# Patient Record
Sex: MALE | Race: WHITE
[De-identification: names, ages, dates, MRNs, and addresses within clinical notes are randomized per-mention and may not be internally consistent; named-entity substitution may affect disease eponyms.]

---
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
## 2019-06-23 ENCOUNTER — HOSPITAL ENCOUNTER (EMERGENCY)
Dept: HOSPITAL 91 - FTE | Age: 19
Discharge: HOME | End: 2019-06-23
Payer: MEDICAID

## 2019-06-23 ENCOUNTER — HOSPITAL ENCOUNTER (EMERGENCY)
Dept: HOSPITAL 10 - FTE | Age: 19
Discharge: HOME | End: 2019-06-23
Payer: MEDICAID

## 2019-06-23 VITALS — SYSTOLIC BLOOD PRESSURE: 134 MMHG | RESPIRATION RATE: 18 BRPM | HEART RATE: 148 BPM | DIASTOLIC BLOOD PRESSURE: 79 MMHG

## 2019-06-23 VITALS
HEIGHT: 66 IN | BODY MASS INDEX: 21.12 KG/M2 | WEIGHT: 131.4 LBS | WEIGHT: 131.4 LBS | HEIGHT: 66 IN | BODY MASS INDEX: 21.12 KG/M2

## 2019-06-23 DIAGNOSIS — R36.9: Primary | ICD-10-CM

## 2019-06-23 LAB
ADD UMIC: YES
UR ASCORBIC ACID: NEGATIVE MG/DL
UR BACTERIA: (no result) /HPF
UR BILIRUBIN (DIP): NEGATIVE MG/DL
UR BLOOD (DIP): NEGATIVE MG/DL
UR CLARITY: (no result)
UR COLOR: (no result)
UR GLUCOSE (DIP): NEGATIVE MG/DL
UR KETONES (DIP): NEGATIVE MG/DL
UR LEUKOCYTE ESTERASE (DIP): (no result) LEU/UL
UR MUCUS: (no result) /HPF
UR NITRITE (DIP): NEGATIVE MG/DL
UR PH (DIP): 6 (ref 5–9)
UR RBC: 19 /HPF (ref 0–5)
UR SPECIFIC GRAVITY (DIP): 1.03 (ref 1–1.03)
UR SQUAMOUS EPITHELIAL CELL: (no result) /HPF
UR TOTAL PROTEIN (DIP): (no result) MG/DL
UR UROBILINOGEN (DIP): (no result) MG/DL
UR WBC: > 182 /HPF (ref 0–5)

## 2019-06-23 PROCEDURE — 99284 EMERGENCY DEPT VISIT MOD MDM: CPT

## 2019-06-23 PROCEDURE — 87591 N.GONORRHOEAE DNA AMP PROB: CPT

## 2019-06-23 PROCEDURE — 81001 URINALYSIS AUTO W/SCOPE: CPT

## 2019-06-23 PROCEDURE — 96372 THER/PROPH/DIAG INJ SC/IM: CPT

## 2019-06-23 RX ADMIN — AZITHROMYCIN 1 MG: 500 TABLET, FILM COATED ORAL at 14:45

## 2019-06-23 RX ADMIN — LIDOCAINE HYDROCHLORIDE 1 ML: 10 INJECTION, SOLUTION EPIDURAL; INFILTRATION; INTRACAUDAL; PERINEURAL at 14:45

## 2019-06-23 RX ADMIN — CEFTRIAXONE SODIUM 1 MG: 250 INJECTION, POWDER, FOR SOLUTION INTRAMUSCULAR; INTRAVENOUS at 14:45

## 2019-06-23 NOTE — ERD
ER Documentation


Chief Complaint


Chief Complaint





pain/burning/puss with urination x 3 days





HPI


18 yr old male presents to the Ed complaining of pain with urination and penile 


d/c x 3 days. He denies any fevers. He denies a previous hx of similar incidence


when asked. He reports that he has only 1 sexual partner that he is loyal too 


and denies any risky behaviors. He reports that there is white puss coming out 


of his penis. He denies any swollen lymph nodes or testicular pain. He reports 


the pain with urination 3/10 stinging pain.





ROS


All systems reviewed and are negative except as per history of present illness.





Medications


Home Meds


Active Scripts


Cephalexin* (Keflex*) 500 Mg Capsule, 500 MG PO QID for 5 Days, CAP


   Prov:MARTÍN CHENG PA-C         6/23/19


Ibuprofen* (Motrin*) 400 Mg Tab, 400 MG PO Q8, #30 TAB


   Prov:SILVA BERMUDEZ MD         11/18/18


Cephalexin* (Keflex*) 500 Mg Capsule, 500 MG PO TID for 7 Days, CAP


   Prov:SILVA BERMUDEZ MD         11/18/18





Allergies


Allergies:  


Coded Allergies:  


     No Known Allergy (Unverified , 6/23/19)





PMhx/Soc


History of Surgery:  No


Anesthesia Reaction:  No


Hx Neurological Disorder:  No


Hx Respiratory Disorders:  No


Hx Cardiac Disorders:  No


Hx Psychiatric Problems:  No


Hx Miscellaneous Medical Probl:  No


Hx Alcohol Use:  No


Hx Substance Use:  No


Hx Tobacco Use:  No


Smoking Status:  Never smoker





FmHx


Family History:  No diabetes





Physical Exam


Vitals





Vital Signs


  Date      Temp  Pulse  Resp  B/P (MAP)   Pulse Ox  O2          O2 Flow    FiO2


Time                                                 Delivery    Rate


   6/23/19  97.2    148    18      134/79        97


     12:29                           (97)





Physical Exam


Const:   No acute distress


Head:   Atraumatic 


Eyes:    Normal Conjunctiva


ENT:    Normal External Ears, Nose and Mouth.


Neck:               Full range of motion. 


Resp:   Clear to auscultation bilaterally


Cardio:   Regular rate and rhythm, 


Abd:    Soft, non tender, non distended


Skin:   : no tender/swollen lymph nodes. No balanitis. No testicular pain. 


White d/c present coming out of penis


Back:   No midline or flank tenderness


Ext:    No cyanosis, or edema


Neur:   Awake and alert


Psych:    Normal Mood and Affect


Results 24 hrs





Laboratory Tests


                Test
                              6/23/19
13:15


                Urine Color                      MALOU


                Urine Clarity                    CLOUDY


                Urine pH                                    6.0


                Urine Specific Gravity                    1.027


                Urine Ketones                    NEGATIVE mg/dL


                Urine Nitrite                    NEGATIVE mg/dL


                Urine Bilirubin                  NEGATIVE mg/dL


                Urine Urobilinogen                     1+ mg/dL


                Urine Leukocyte Esterase              3+ Merline/ul


                Urine Microscopic RBC                   19 /HPF


                Urine Microscopic WBC            > 182 /HPF


                Urine Squamous Epithelial
Cells  FEW /HPF 



                Urine Bacteria                   FEW /HPF


                Urine Mucus                      MODERATE /HPF


                Urine Hemoglobin                 NEGATIVE mg/dL


                Urine Glucose                    NEGATIVE mg/dL


                Urine Total Protein                    2+ mg/dl





Current Medications


 Medications
   Dose
          Sig/Raji
       Start Time
   Status  Last


 (Trade)       Ordered        Route
 PRN     Stop Time              Admin
Dose


                              Reason                                Admin


 Ceftriaxone    250 mg         ONCE  ONCE
    6/23/19       DC           6/23/19


Sodium
                       IM
            14:30
                       14:45



(Rocephin)                                   6/23/19 14:32


 Lidocaine
     5 ml           ONCE  ONCE
    6/23/19       DC           6/23/19


(Xylocaine                    INJ
           14:30
                       14:45



1%
  (Mpf))                                  6/23/19 14:32


                1,000 mg       ONCE  ONCE
    6/23/19       DC           6/23/19


Azithromycin
                 PO
            14:30
                       14:45



 (Zithromax)                                 6/23/19 14:32








Procedures/MDM


ED COURSE:


The patient was stable throughout ED course. I kept the patient informed of labo


ratory and diagnostic imaging results throughout the ED course.  








DIAGNOSTIC IMAGING:


NONE











PROCEDURES:


 NONE








MEDICATIONS GIVEN: 


Azithromycin, Lidocaine, Rocephin


Patient tolerated medication well with no adverse reactions. Patient reported 


improvement in pain. 











MEDICAL DECISION MAKING:


Patient is a 18 yr old male reporting pain with urination and white d/c from his


penis x 3 days. He denies any new sexual partners or risky behavior. He is quite


and not very open to conversation about this subject. On exam, white d/c 


expelled out of the penis. Urinalysis was done showing signs of infection. G/C 


was ordered and sent out. Patient stated that he wants to be treated for this 


now while he is here. Azithromycin, Lidocaine, and Rocephin was given to the p


atient in the ED stay with no ASE. Patient was discharged with a  prescription 


for Keflex and told to follow up with primary care in the next 1-2 days. Patient


was told he would get a call for his G/C results. Vital signs were reviewed. 


Patient is afebrile. Patient was not hypoxic. Patient was hemodynamically 


stable. 








PRESCRIPTION: Keflex





DISCHARGE:


At this time, patient is stable for discharge and outpatient management. I have 


instructed the patient to follow-up with his/her primary care physician in 1-2 


days. I have discussed with the patient the possibility of needing to see a 


specialist for further workup and imaging studies if symptoms persist. I have 


instructed the patient to promptly return to the ER for any new or worsening 


symptoms including increased pain, fever, nausea, vomiting, weakness or LOC. The


patient and/or family expressed understanding of and agreement with this plan. 


All questions were answered. Home care instructions were provided. 





Disclaimer: Inadvertent spelling and grammatical errors are likely due to 


EHR/dictation software use and do not reflect on the overall quality of patient 


care. Also, please note that the electronic time recorded on this note does not 


necessarily reflect the actual time of the patient encounter.





Departure


Diagnosis:  


   Primary Impression:  


   Discharge from genitalia


Condition:  Fair


Patient Instructions:  Urethritis, Male (Gonorrhea)


Referrals:  


[facility]


YOU HAVE RECEIVED A MEDICAL SCREENING EXAM AND THE RESULTS INDICATE THAT YOU DO 


NOT HAVE A CONDITION THAT REQUIRES URGENT TREATMENT IN THE EMERGENCY DEPARTMENT.





FURTHER EVALUATION AND TREATMENT OF YOUR CONDITION CAN WAIT UNTIL YOU ARE SEEN 


IN YOUR DOCTORS OFFICE WITHIN THE NEXT 1-2 DAYS. IT IS YOUR RESPONSIBILITY TO 


MAKE AN APPOINTMENT FOR FOLOW-UP CARE.





IF YOU HAVE A PRIMARY DOCTOR


--you should call your primary doctor and schedule an appointment





IF YOU DO NOT HAVE A PRIMARY DOCTOR YOU CAN CALL OUR PHYSICIAN REFERRAL HOTLINE 


AT


 [phone] 





IF YOU CAN NOT AFFORD TO SEE A PHYSICIAN YOU CAN CHOSE FROM THE FOLLOWING 


[facility]





[facility] [phone] [address] [facility] [phone] [address] [facility]. [facility] [phone] [address] [facility] [phone] [address] [facility] [phone] [address] [facility]. [facility]. [phone] [address] [facility]. [facility]


YOU HAVE RECEIVED A MEDICAL SCREENING EXAM AND THE RESULTS INDICATE THAT YOU DO 


NOT HAVE A CONDITION THAT REQUIRES URGENT TREATMENT IN THE EMERGENCY DEPARTMENT.





FURTHER EVALUATION AND TREATMENT OF YOUR CONDITION CAN WAIT UNTIL YOU ARE SEEN 


IN YOUR DOCTORS OFFICE WITHIN THE NEXT 1-2 DAYS. IT IS YOUR RESPONSIBILITY TO 


MAKE AN APPOINTMENT FOR FOLOW-UP CARE.





IF YOU HAVE A PRIMARY DOCTOR


--you should call your primary doctor and schedule and appointment





IF YOU DO NOT HAVE A PRIMARY DOCTOR YOU CAN CALL OUR PHYSICIAN REFERRAL HOTLINE 


AT [phone].





IF YOU CAN NOT AFFORD TO SEE A PHYSICIAN YOU CAN CHOSE FROM THE FOLLOWING [facility]


INSTITUTIONS:





[facility]


[address] [place] [address]





[facility]


[address] [place] [address]





[facility]


[address] [place] [address]





Additional Instructions:  


Call your primary care doctor TOMORROW for an appointment during the next 1-2 


days.See the doctor sooner or return here if your condition worsens before your 


appointment time.











MARTÍN CHENG PA-C           Jun 23, 2019 14:35

## 2019-06-25 LAB — LABORATORY COMMENT REPORT: (no result)

## 2019-07-31 ENCOUNTER — HOSPITAL ENCOUNTER (EMERGENCY)
Dept: HOSPITAL 91 - FTE | Age: 19
Discharge: HOME | End: 2019-07-31
Payer: MEDICAID

## 2019-07-31 ENCOUNTER — HOSPITAL ENCOUNTER (EMERGENCY)
Dept: HOSPITAL 10 - FTE | Age: 19
Discharge: HOME | End: 2019-07-31
Payer: MEDICAID

## 2019-07-31 VITALS
HEIGHT: 65 IN | BODY MASS INDEX: 21.67 KG/M2 | BODY MASS INDEX: 21.67 KG/M2 | WEIGHT: 130.07 LBS | WEIGHT: 130.07 LBS | HEIGHT: 65 IN

## 2019-07-31 VITALS — DIASTOLIC BLOOD PRESSURE: 80 MMHG | HEART RATE: 104 BPM | SYSTOLIC BLOOD PRESSURE: 129 MMHG | RESPIRATION RATE: 24 BRPM

## 2019-07-31 DIAGNOSIS — N34.2: Primary | ICD-10-CM

## 2019-07-31 PROCEDURE — 96372 THER/PROPH/DIAG INJ SC/IM: CPT

## 2019-07-31 PROCEDURE — 87591 N.GONORRHOEAE DNA AMP PROB: CPT

## 2019-07-31 PROCEDURE — 87086 URINE CULTURE/COLONY COUNT: CPT

## 2019-07-31 PROCEDURE — 99284 EMERGENCY DEPT VISIT MOD MDM: CPT

## 2019-07-31 RX ADMIN — AZITHROMYCIN 1 MG: 500 TABLET, FILM COATED ORAL at 10:23

## 2019-07-31 RX ADMIN — CEFTRIAXONE SODIUM 1 MG: 250 INJECTION, POWDER, FOR SOLUTION INTRAMUSCULAR; INTRAVENOUS at 10:23

## 2019-07-31 NOTE — ERD
ER Documentation


Chief Complaint


Chief Complaint





painful urination, puus in the urine started yesterday





HPI


18-year-old male is here with dysuria and purulent drainage from his penis that 


began yesterday.  He was seen here a little over a month ago for the same and 


was treated and states he did get better.  He does admit to recent unprotected 


sex about 2 weeks ago he had no fever.  No nausea or vomiting.  No testicular 


swelling.  No hematuria.





ROS


All systems reviewed and are negative except as per history of present illness.





Medications


Home Meds


Active Scripts


Doxycycline Hyclate* (Doxycycline Hyclate*) 100 Mg Tablet.dr, 100 MG PO BID for 


7 Days, TAB


   Prov:GREGORIO LEAHY PA-C         7/31/19


Cephalexin* (Keflex*) 500 Mg Capsule, 500 MG PO QID for 5 Days, CAP


   Prov:MARTÍN CHENG PA-C         6/23/19


Ibuprofen* (Motrin*) 400 Mg Tab, 400 MG PO Q8, #30 TAB


   Prov:SILVA BERMUDEZ MD         11/18/18


Cephalexin* (Keflex*) 500 Mg Capsule, 500 MG PO TID for 7 Days, CAP


   Prov:SILVA BERMUDEZ MD         11/18/18





Allergies


Allergies:  


Coded Allergies:  


     No Known Allergy (Unverified , 6/23/19)





PMhx/Soc


History of Surgery:  No


Anesthesia Reaction:  No


Hx Neurological Disorder:  No


Hx Respiratory Disorders:  No


Hx Cardiac Disorders:  No


Hx Psychiatric Problems:  No


Hx Miscellaneous Medical Probl:  No


Hx Alcohol Use:  No


Hx Substance Use:  No


Hx Tobacco Use:  No





FmHx


Family History:  No diabetes





Physical Exam


Vitals





Vital Signs


  Date      Temp  Pulse  Resp  B/P (MAP)   Pulse Ox  O2          O2 Flow    FiO2


Time                                                 Delivery    Rate


   7/31/19  98.0    104    24      129/80       100


     10:04                           (96)





Physical Exam


Const:   No acute distress


Head:   Atraumatic 


Eyes:    Normal Conjunctiva


ENT:    Normal External Ears, Nose and Mouth.


Neck:               Full range of motion. No meningismus.


Resp:   Clear to auscultation bilaterally


Cardio:   Regular rate and rhythm, no murmurs


Abd:    Soft, non tender, non distended. Normal bowel sounds


: Scant purulent drainage from tip of penis, no inguinal lymphadenopathy, no 


penile rashes, no testicular tenderness or swelling


Results 24 hrs





Current Medications


 Medications
   Dose
          Sig/Raji
       Start Time
   Status  Last


 (Trade)       Ordered        Route
 PRN     Stop Time              Admin
Dose


                              Reason                                Admin


                1,000 mg       ONCE  ONCE
    7/31/19                



Azithromycin
                 PO
            10:30



 (Zithromax)                                 7/31/19 10:31


 Ceftriaxone    250 mg         ONCE  ONCE
    7/31/19                



Sodium
                       IM
            10:30



(Rocephin)                                   7/31/19 10:31








Procedures/MDM


I reviewed patient's previous visit which she was here for the same thing a 


little over a month ago and his gonorrhea chlamydia culture did come back 


positive.  Therefore today urine culture was sent and gonorrhea chlamydia test 


was again resent.  He was given Rocephin and ceftriaxone and discharged with 


doxycycline.  Patient needs to follow-up with his primary care doctor for full 


STD panel testing and avoid sexual contact until symptoms of all been resolved 


and he has been tested.  Patient counseled regarding my diagnostic impression 


and care plan. Prior to discharge all questions answered. Pt agrees with 


treatment plan and understands strict return precautions. Pt is instructed to 


follow up with primary care provider within 24-48 hours. Precautionary 


instructions provided including instructions to return to the ER if not 


improving or for any worsening or changing symptoms or concerns.





Departure


Diagnosis:  


   Primary Impression:  


   Urethritis


Condition:  Stable


Patient Instructions:  Urethritis, Male (Gc Vs. Chlam)





Additional Instructions:  


Call your primary care doctor TOMORROW for an appointment during the next 1-2 


days.See the doctor sooner or return here if your condition worsens before your 


appointment time.











GREGORIO LEAHY PA-C            Jul 31, 2019 10:26

## 2019-08-01 LAB — LABORATORY COMMENT REPORT: (no result)

## 2019-09-01 ENCOUNTER — HOSPITAL ENCOUNTER (EMERGENCY)
Dept: HOSPITAL 91 - FTE | Age: 19
LOS: 1 days | Discharge: HOME | End: 2019-09-02
Payer: MEDICAID

## 2019-09-01 ENCOUNTER — HOSPITAL ENCOUNTER (EMERGENCY)
Dept: HOSPITAL 10 - FTE | Age: 19
LOS: 1 days | Discharge: HOME | End: 2019-09-02
Payer: MEDICAID

## 2019-09-01 VITALS
HEIGHT: 65 IN | BODY MASS INDEX: 22.92 KG/M2 | WEIGHT: 137.57 LBS | HEIGHT: 65 IN | WEIGHT: 137.57 LBS | BODY MASS INDEX: 22.92 KG/M2

## 2019-09-01 DIAGNOSIS — A64: Primary | ICD-10-CM

## 2019-09-01 DIAGNOSIS — F17.210: ICD-10-CM

## 2019-09-01 PROCEDURE — 96372 THER/PROPH/DIAG INJ SC/IM: CPT

## 2019-09-01 PROCEDURE — 99284 EMERGENCY DEPT VISIT MOD MDM: CPT

## 2019-09-01 PROCEDURE — 87086 URINE CULTURE/COLONY COUNT: CPT

## 2019-09-01 PROCEDURE — 81001 URINALYSIS AUTO W/SCOPE: CPT

## 2019-09-01 PROCEDURE — 36415 COLL VENOUS BLD VENIPUNCTURE: CPT

## 2019-09-01 PROCEDURE — 86592 SYPHILIS TEST NON-TREP QUAL: CPT

## 2019-09-01 PROCEDURE — 87591 N.GONORRHOEAE DNA AMP PROB: CPT

## 2019-09-02 LAB
ADD UMIC: YES
RAPID PLASMA REAGIN: NONREACTIVE
UR ASCORBIC ACID: NEGATIVE MG/DL
UR BACTERIA: (no result) /HPF
UR BILIRUBIN (DIP): NEGATIVE MG/DL
UR BLOOD (DIP): NEGATIVE MG/DL
UR CLARITY: CLEAR
UR COLOR: YELLOW
UR GLUCOSE (DIP): NEGATIVE MG/DL
UR KETONES (DIP): NEGATIVE MG/DL
UR LEUKOCYTE ESTERASE (DIP): (no result) LEU/UL
UR NITRITE (DIP): NEGATIVE MG/DL
UR PH (DIP): 5 (ref 5–9)
UR RBC: 1 /HPF (ref 0–5)
UR SPECIFIC GRAVITY (DIP): 1.02 (ref 1–1.03)
UR TOTAL PROTEIN (DIP): NEGATIVE MG/DL
UR UROBILINOGEN (DIP): NEGATIVE MG/DL
UR WBC: 73 /HPF (ref 0–5)

## 2019-09-02 RX ADMIN — DOXYCYCLINE HYCLATE 1 MG: 100 TABLET, COATED ORAL at 00:01

## 2019-09-02 RX ADMIN — CEFTRIAXONE SODIUM 1 MG: 250 INJECTION, POWDER, FOR SOLUTION INTRAMUSCULAR; INTRAVENOUS at 00:01

## 2019-09-02 RX ADMIN — AZITHROMYCIN 1 MG: 500 TABLET, FILM COATED ORAL at 00:01

## 2019-09-04 LAB — LABORATORY COMMENT REPORT: (no result)
